# Patient Record
Sex: MALE | Race: WHITE | Employment: FULL TIME | URBAN - METROPOLITAN AREA
[De-identification: names, ages, dates, MRNs, and addresses within clinical notes are randomized per-mention and may not be internally consistent; named-entity substitution may affect disease eponyms.]

---

## 2017-04-17 ENCOUNTER — IP HISTORICAL/CONVERTED ENCOUNTER (OUTPATIENT)
Dept: OTHER | Age: 42
End: 2017-04-17

## 2017-07-21 ENCOUNTER — OP HISTORICAL/CONVERTED ENCOUNTER (OUTPATIENT)
Dept: OTHER | Age: 42
End: 2017-07-21

## 2018-04-24 ENCOUNTER — OFFICE VISIT (OUTPATIENT)
Dept: URGENT CARE | Age: 43
End: 2018-04-24

## 2018-04-24 VITALS
RESPIRATION RATE: 20 BRPM | TEMPERATURE: 98.2 F | SYSTOLIC BLOOD PRESSURE: 142 MMHG | HEIGHT: 74 IN | HEART RATE: 89 BPM | BODY MASS INDEX: 30.93 KG/M2 | DIASTOLIC BLOOD PRESSURE: 75 MMHG | OXYGEN SATURATION: 97 % | WEIGHT: 241 LBS

## 2018-04-24 DIAGNOSIS — L03.012 CELLULITIS OF FINGER OF LEFT HAND: Primary | ICD-10-CM

## 2018-04-24 DIAGNOSIS — R06.02 SOB (SHORTNESS OF BREATH): ICD-10-CM

## 2018-04-24 RX ORDER — LUBIPROSTONE 24 UG/1
24 CAPSULE, GELATIN COATED ORAL
COMMUNITY
Start: 2017-08-21

## 2018-04-24 RX ORDER — FUROSEMIDE 40 MG/1
TABLET ORAL
COMMUNITY
Start: 2017-08-15

## 2018-04-24 RX ORDER — LISINOPRIL 20 MG/1
20 TABLET ORAL
COMMUNITY
Start: 2017-09-18

## 2018-04-24 RX ORDER — DOXYCYCLINE 100 MG/1
100 TABLET ORAL 2 TIMES DAILY
Qty: 20 TAB | Refills: 0 | Status: SHIPPED | OUTPATIENT
Start: 2018-04-24 | End: 2018-05-04

## 2018-04-24 RX ORDER — CARVEDILOL 6.25 MG/1
6.25 TABLET ORAL
COMMUNITY
Start: 2017-09-24

## 2018-04-24 RX ORDER — PRASUGREL 10 MG/1
10 TABLET, FILM COATED ORAL
COMMUNITY

## 2018-04-24 RX ORDER — LEVETIRACETAM 750 MG/1
1 TABLET, EXTENDED RELEASE ORAL
COMMUNITY
Start: 2017-08-16

## 2018-04-24 RX ORDER — METFORMIN HYDROCHLORIDE 1000 MG/1
1000 TABLET ORAL
COMMUNITY
Start: 2017-09-18

## 2018-04-24 RX ORDER — GLIPIZIDE 10 MG/1
TABLET ORAL
COMMUNITY
Start: 2017-10-11

## 2018-04-24 RX ORDER — GUAIFENESIN 100 MG/5ML
LIQUID (ML) ORAL
COMMUNITY

## 2018-04-24 NOTE — PROGRESS NOTES
HPI Comments: Jennifer Majo with hx of  CAD s/p bypass, Sz, DM presents with left 4th finger infection after cutting finger 6 days ago, reports decreased ROM, worsening redness, swelling. Denies Fever. Reports SOB for the past few weeks. Went to cardiologist last week who stated \"bloodwork was fine\" per patient. Has not had CXR. Denies CP, dizziness, palpitations. The history is provided by the patient. Past Medical History:   Diagnosis Date    CAD (coronary artery disease)     Diabetes ([facility] [address] [facility]     Seizures ([facility]         History reviewed. No pertinent surgical history. History reviewed. No pertinent family history. Social History     Social History    Marital status: SINGLE     Spouse name: N/A    Number of children: N/A    Years of education: N/A     Occupational History    Not on file. Social History Main Topics    Smoking status: Current Every Day Smoker    Smokeless tobacco: Never Used    Alcohol use Not on file    Drug use: Not on file    Sexual activity: Not on file     Other Topics Concern    Not on file     Social History Narrative    No narrative on file                ALLERGIES: Amitriptyline; Cephalexin; Ciprofloxacin; Enoxaparin; Exenatide; Penicillins; and Repaglinide-metformin    Review of Systems   Constitutional: Negative for chills and fever. Respiratory: Positive for shortness of breath. Negative for wheezing. Cardiovascular: Negative for chest pain and palpitations. Musculoskeletal: Positive for arthralgias and joint swelling. Negative for myalgias. Skin: Positive for color change. Neurological: Negative for weakness and numbness. Hematological: Negative for adenopathy. Vitals:    04/24/18 1248   BP: 142/75   Pulse: 89   Resp: 20   Temp: 98.2 °F (36.8 °C)   SpO2: 97%   Weight: 241 lb (109.3 kg)   Height: 6' 2\" (1.88 m)       Physical Exam   Constitutional: He appears well-developed and well-nourished. No distress.    Cardiovascular: Normal rate, regular rhythm and normal heart sounds. Pulmonary/Chest: Effort normal and breath sounds normal. No respiratory distress. He has no wheezes. He has no rales. Musculoskeletal:   Left 4th finger at PIP joint: erythematous at the dorsal aspect, decreased flexion, slight swollen, TTP, warm   Neurological: He is alert. Skin: He is not diaphoretic. Psychiatric: He has a normal mood and affect. His behavior is normal. Judgment and thought content normal.   Nursing note and vitals reviewed. MDM    Procedures        ICD-10-CM ICD-9-CM    1. Cellulitis of finger of left hand L03.012 681.00 XR 4TH FINGER LT MIN 2 V   2. SOB (shortness of breath) R06.02 786.05 XR CHEST PA LAT     Medications Ordered Today   Medications    doxycycline (ADOXA) 100 mg tablet     Sig: Take 1 Tab by mouth two (2) times a day for 10 days. Dispense:  20 Tab     Refill:  0     The patients condition was discussed with the patient and they understand. The patient is to follow up with PCP. If signs and symptoms become worse the pt is to go to the ER. The patient is to take medications as prescribed. XR Results (most recent):    Results from Appointment encounter on 04/24/18   XR CHEST PA LAT   Narrative Exam:  2 view chest    Indication: Shortness of breath    PA and lateral views demonstrate normal heart size. The patient is status post  median sternotomy. Chronic pleural thickening is noted at the lung bases with  elevation of the left hemidiaphragm. Scarring is noted in the left lower lobe. There is no acute process in the lung fields. Mild degenerative changes are  noted in the thoracic spine. Impression Impression: No acute process.

## 2018-04-24 NOTE — PATIENT INSTRUCTIONS
Cellulitis: Care Instructions  Your Care Instructions    Cellulitis is a skin infection. It often occurs after a break in the skin from a scrape, cut, bite, or puncture, or after a rash. The doctor has checked you carefully, but problems can develop later. If you notice any problems or new symptoms, get medical treatment right away. Follow-up care is a key part of your treatment and safety. Be sure to make and go to all appointments, and call your doctor if you are having problems. It's also a good idea to know your test results and keep a list of the medicines you take. How can you care for yourself at home? · Take your antibiotics as directed. Do not stop taking them just because you feel better. You need to take the full course of antibiotics. · Prop up the infected area on pillows to reduce pain and swelling. Try to keep the area above the level of your heart as often as you can. · If your doctor told you how to care for your wound, follow your doctor's instructions. If you did not get instructions, follow this general advice:  ¨ Wash the wound with clean water 2 times a day. Don't use hydrogen peroxide or alcohol, which can slow healing. ¨ You may cover the wound with a thin layer of petroleum jelly, such as Vaseline, and a nonstick bandage. ¨ Apply more petroleum jelly and replace the bandage as needed. · Be safe with medicines. Take pain medicines exactly as directed. ¨ If the doctor gave you a prescription medicine for pain, take it as prescribed. ¨ If you are not taking a prescription pain medicine, ask your doctor if you can take an over-the-counter medicine. To prevent cellulitis in the future  · Try to prevent cuts, scrapes, or other injuries to your skin. Cellulitis most often occurs where there is a break in the skin. · If you get a scrape, cut, mild burn, or bite, wash the wound with clean water as soon as you can to help avoid infection.  Don't use hydrogen peroxide or alcohol, which can slow healing. · If you have swelling in your legs (edema), support stockings and good skin care may help prevent leg sores and cellulitis. · Take care of your feet, especially if you have diabetes or other conditions that increase the risk of infection. Wear shoes and socks. Do not go barefoot. If you have athlete's foot or other skin problems on your feet, talk to your doctor about how to treat them. When should you call for help? Call your doctor now or seek immediate medical care if:  ? · You have signs that your infection is getting worse, such as:  ¨ Increased pain, swelling, warmth, or redness. ¨ Red streaks leading from the area. ¨ Pus draining from the area. ¨ A fever. ? · You get a rash. ? Watch closely for changes in your health, and be sure to contact your doctor if:  ? · You are not getting better after 1 day (24 hours). ? · You do not get better as expected. Where can you learn more? Go to http://chet-praneeth.info/. Basil Quinones in the search box to learn more about \"Cellulitis: Care Instructions. \"  Current as of: October 13, 2016  Content Version: 11.4  © 8924-6019 [employer]. Care instructions adapted under license by [employer] (which disclaims liability or warranty for this information). If you have questions about a medical condition or this instruction, always ask your healthcare professional. [place] [address] any warranty or liability for your use of this information. Shortness of Breath: Care Instructions  Your Care Instructions  Shortness of breath has many causes. Sometimes conditions such as anxiety can lead to shortness of breath. Some people get mild shortness of breath when they exercise. Trouble breathing also can be a symptom of a serious problem, such as asthma, lung disease, emphysema, heart problems, and pneumonia. If your shortness of breath continues, you may need tests and treatment.  Watch for any changes in your breathing and other symptoms. Follow-up care is a key part of your treatment and safety. Be sure to make and go to all appointments, and call your doctor if you are having problems. It's also a good idea to know your test results and keep a list of the medicines you take. How can you care for yourself at home? · Do not smoke or allow others to smoke around you. If you need help quitting, talk to your doctor about stop-smoking programs and medicines. These can increase your chances of quitting for good. · Get plenty of rest and sleep. · Take your medicines exactly as prescribed. Call your doctor if you think you are having a problem with your medicine. · Find healthy ways to deal with stress. ¨ Exercise daily. ¨ Get plenty of sleep. ¨ Eat regularly and well. When should you call for help? Call 911 anytime you think you may need emergency care. For example, call if:  ? · You have severe shortness of breath. ? · You have symptoms of a heart attack. These may include:  ¨ Chest pain or pressure, or a strange feeling in the chest.  ¨ Sweating. ¨ Shortness of breath. ¨ Nausea or vomiting. ¨ Pain, pressure, or a strange feeling in the back, neck, jaw, or upper belly or in one or both shoulders or arms. ¨ Lightheadedness or sudden weakness. ¨ A fast or irregular heartbeat. After you call 911, the  may tell you to chew 1 adult-strength or 2 to 4 low-dose aspirin. Wait for an ambulance. Do not try to drive yourself. ?Call your doctor now or seek immediate medical care if:  ? · Your shortness of breath gets worse or you start to wheeze. Wheezing is a high-pitched sound when you breathe. ? · You wake up at night out of breath or have to prop your head up on several pillows to breathe. ? · You are short of breath after only light activity or while at rest.   ? Watch closely for changes in your health, and be sure to contact your doctor if:  ? · You do not get better over the next 1 to 2 days. Where can you learn more? Go to http://chet-praneeth.info/. Enter S780 in the search box to learn more about \"Shortness of Breath: Care Instructions. \"  Current as of: May 12, 2017  Content Version: 11.4  © 3511-3587 [employer]. Care instructions adapted under license by [employer] (which disclaims liability or warranty for this information). If you have questions about a medical condition or this instruction, always ask your healthcare professional. [address] any warranty or liability for your use of this information.

## 2018-04-24 NOTE — MR AVS SNAPSHOT
[facility]  
[phone] Patient: Juliette Harper MRN: [id number] :1975 Visit Information Date & Time Provider Department Dept. Phone Encounter #  
 2018 12:30 PM [facility] [phone] [id number] Upcoming Health Maintenance Date Due DTaP/Tdap/Td series (1 - Tdap) 10/23/1996 Influenza Age 5 to Adult 2017 Allergies as of 2018  Review Complete On: 2018 By: Cheyenne Galarza MD  
  
 Severity Noted Reaction Type Reactions Amitriptyline  10/13/2017    Unknown (comments)  
 seizures Cephalexin  10/13/2017    Unknown (comments)  
 seizures Ciprofloxacin  10/13/2017    Unknown (comments)  
 seizures Enoxaparin  10/13/2017    Unknown (comments)  
 seizures Exenatide Low 10/13/2017    Nausea and Vomiting, Rash Penicillins Low 10/13/2017    Rash Breathing difficulties Repaglinide-metformin Low 10/13/2017    Rash Current Immunizations  Never Reviewed No immunizations on file. Not reviewed this visit You Were Diagnosed With   
  
 Codes Comments Cellulitis of finger of left hand    -  Primary ICD-10-CM: L03.012 
ICD-9-CM: 681.00 SOB (shortness of breath)     ICD-10-CM: R06.02 
ICD-9-CM: 786.05 Vitals BP Pulse Temp Resp Height(growth percentile) Weight(growth percentile) 142/75 89 98.2 °F (36.8 °C) 20 6' 2\" (1.88 m) 241 lb (109.3 kg) SpO2 BMI Smoking Status 97% 30.94 kg/m2 Current Every Day Smoker BMI and BSA Data Body Mass Index Body Surface Area 30.94 kg/m 2 2.39 m 2 Your Updated Medication List  
  
   
This list is accurate as of 18  1:36 PM.  Always use your most recent med list.  
  
  
  
  
 AMITIZA 24 mcg capsule Generic drug:  lubiPROStone Take 24 mcg by mouth. aspirin 81 mg chewable tablet Take  by mouth.   
  
 carvedilol 6.25 mg tablet Commonly known as:  Joan Solid Take 6.25 mg by mouth. doxycycline 100 mg tablet Commonly known as:  ADOXA Take 1 Tab by mouth two (2) times a day for 10 days. furosemide 40 mg tablet Commonly known as:  LASIX TAKE 1 AND A HALF TABLETS (60 MG) BY MOUTH 2 TIMES PER DAY  
  
 glipiZIDE 10 mg tablet Commonly known as:  GLUCOTROL  
  
 levETIRAcetam 750 mg ER tablet Commonly known as:  KEPPRA XR Take 1 Tab by mouth.  
  
 lisinopril 20 mg tablet Commonly known as:  Pecola Cypher Take 20 mg by mouth.  
  
 metFORMIN 1,000 mg tablet Commonly known as:  GLUCOPHAGE Take 1,000 mg by mouth.  
  
 prasugrel 10 mg tablet Commonly known as:  EFFIENT Take 10 mg by mouth. Prescriptions Printed Refills  
 doxycycline (ADOXA) 100 mg tablet 0 Sig: Take 1 Tab by mouth two (2) times a day for 10 days. Class: Print Route: Oral  
  
To-Do List   
 04/24/2018 Imaging:  XR 4TH FINGER LT MIN 2 V   
  
 04/24/2018 Imaging:  XR CHEST PA LAT Patient Instructions Cellulitis: Care Instructions Your Care Instructions Cellulitis is a skin infection. It often occurs after a break in the skin from a scrape, cut, bite, or puncture, or after a rash. The doctor has checked you carefully, but problems can develop later. If you notice any problems or new symptoms, get medical treatment right away. Follow-up care is a key part of your treatment and safety. Be sure to make and go to all appointments, and call your doctor if you are having problems. It's also a good idea to know your test results and keep a list of the medicines you take. How can you care for yourself at home? · Take your antibiotics as directed. Do not stop taking them just because you feel better. You need to take the full course of antibiotics. · Prop up the infected area on pillows to reduce pain and swelling. Try to keep the area above the level of your heart as often as you can.  
· If your doctor told you how to care for your wound, follow your doctor's instructions. If you did not get instructions, follow this general advice: ¨ Wash the wound with clean water 2 times a day. Don't use hydrogen peroxide or alcohol, which can slow healing. ¨ You may cover the wound with a thin layer of petroleum jelly, such as Vaseline, and a nonstick bandage. ¨ Apply more petroleum jelly and replace the bandage as needed. · Be safe with medicines. Take pain medicines exactly as directed. ¨ If the doctor gave you a prescription medicine for pain, take it as prescribed. ¨ If you are not taking a prescription pain medicine, ask your doctor if you can take an over-the-counter medicine. To prevent cellulitis in the future · Try to prevent cuts, scrapes, or other injuries to your skin. Cellulitis most often occurs where there is a break in the skin. · If you get a scrape, cut, mild burn, or bite, wash the wound with clean water as soon as you can to help avoid infection. Don't use hydrogen peroxide or alcohol, which can slow healing. · If you have swelling in your legs (edema), support stockings and good skin care may help prevent leg sores and cellulitis. · Take care of your feet, especially if you have diabetes or other conditions that increase the risk of infection. Wear shoes and socks. Do not go barefoot. If you have athlete's foot or other skin problems on your feet, talk to your doctor about how to treat them. When should you call for help? Call your doctor now or seek immediate medical care if: 
? · You have signs that your infection is getting worse, such as: 
¨ Increased pain, swelling, warmth, or redness. ¨ Red streaks leading from the area. ¨ Pus draining from the area. ¨ A fever. ? · You get a rash. ? Watch closely for changes in your health, and be sure to contact your doctor if: 
? · You are not getting better after 1 day (24 hours). ? · You do not get better as expected.   
Where can you learn more? Go to http://chet-praneeth.info/. Juan Murrell in the search box to learn more about \"Cellulitis: Care Instructions. \" Current as of: October 13, 2016 Content Version: 11.4 © 8067-0199 [employer]. Care instructions adapted under license by [employer] (which disclaims liability or warranty for this information). If you have questions about a medical condition or this instruction, always ask your healthcare professional. [address] any warranty or liability for your use of this information. Shortness of Breath: Care Instructions Your Care Instructions Shortness of breath has many causes. Sometimes conditions such as anxiety can lead to shortness of breath. Some people get mild shortness of breath when they exercise. Trouble breathing also can be a symptom of a serious problem, such as asthma, lung disease, emphysema, heart problems, and pneumonia. If your shortness of breath continues, you may need tests and treatment. Watch for any changes in your breathing and other symptoms. Follow-up care is a key part of your treatment and safety. Be sure to make and go to all appointments, and call your doctor if you are having problems. It's also a good idea to know your test results and keep a list of the medicines you take. How can you care for yourself at home? · Do not smoke or allow others to smoke around you. If you need help quitting, talk to your doctor about stop-smoking programs and medicines. These can increase your chances of quitting for good. · Get plenty of rest and sleep. · Take your medicines exactly as prescribed. Call your doctor if you think you are having a problem with your medicine. · Find healthy ways to deal with stress. ¨ Exercise daily. ¨ Get plenty of sleep. ¨ Eat regularly and well. When should you call for help? Call 911 anytime you think you may need emergency care.  For example, call if: ? · You have severe shortness of breath. ? · You have symptoms of a heart attack. These may include: ¨ Chest pain or pressure, or a strange feeling in the chest. 
¨ Sweating. ¨ Shortness of breath. ¨ Nausea or vomiting. ¨ Pain, pressure, or a strange feeling in the back, neck, jaw, or upper belly or in one or both shoulders or arms. ¨ Lightheadedness or sudden weakness. ¨ A fast or irregular heartbeat. After you call 911, the  may tell you to chew 1 adult-strength or 2 to 4 low-dose aspirin. Wait for an ambulance. Do not try to drive yourself. ?Call your doctor now or seek immediate medical care if: 
? · Your shortness of breath gets worse or you start to wheeze. Wheezing is a high-pitched sound when you breathe. ? · You wake up at night out of breath or have to prop your head up on several pillows to breathe. ? · You are short of breath after only light activity or while at rest. ? Watch closely for changes in your health, and be sure to contact your doctor if: 
? · You do not get better over the next 1 to 2 days. Where can you learn more? Go to http://chet-praneeth.info/. Enter S780 in the search box to learn more about \"Shortness of Breath: Care Instructions. \" Current as of: May 12, 2017 Content Version: 11.4 © 2675-6222 [employer]ia. Care instructions adapted under license by [employer] (which disclaims liability or warranty for this information). If you have questions about a medical condition or this instruction, always ask your healthcare professional. [place] [address] any warranty or liability for your use of this information. Introducing [facility]! Pauline Manning introduces [employer] patient portal. Now you can access parts of your medical record, email your doctor's office, and request medication refills online. 1. In your internet browser, go to [email or web address] 2.  Click on the First Time User? Click Here link in the Sign In box. You will see the New Member Sign Up page. 3. Enter your Zhanzuo Access Code exactly as it appears below. You will not need to use this code after youve completed the sign-up process. If you do not sign up before the expiration date, you must request a new code. · Zhan[employer]o Access Code: [email or web address] Expires: 7/23/2018 12:33 PM 
 
4. Enter the last four digits of your Social Security Number (xxxx) and Date of Birth (mm/dd/yyyy) as indicated and click Submit. You will be taken to the next sign-up page. 5. Create a Zhanzuo ID. This will be your Zhanzuo login ID and cannot be changed, so think of one that is secure and easy to remember. 6. Create a Zhanzuo password. You can change your password at any time. 7. Enter your Password Reset Question and Answer. This can be used at a later time if you forget your password. 8. Enter your e-mail address. You will receive e-mail notification when new information is available in [address] 9. Click Sign Up. You can now view and download portions of your medical record. 10. Click the Download Summary menu link to download a portable copy of your medical information. If you have questions, please visit the Frequently Asked Questions section of the Zhan[employer]o website. Remember, Zhanzuo is NOT to be used for urgent needs. For medical emergencies, dial 911. Now available from your iPhone and Android! Please provide this summary of care documentation to your next provider. Your primary care clinician is listed as Caitie Later. If you have any questions after today's visit, please call [phone].

## 2021-07-20 ENCOUNTER — HOSPITAL ENCOUNTER (EMERGENCY)
Age: 46
Discharge: HOME | End: 2021-07-20
Attending: EMERGENCY MEDICINE
Payer: COMMERCIAL

## 2021-07-20 VITALS
HEART RATE: 65 BPM | OXYGEN SATURATION: 98 % | TEMPERATURE: 97.8 F | RESPIRATION RATE: 18 BRPM | DIASTOLIC BLOOD PRESSURE: 72 MMHG | HEIGHT: 73 IN | SYSTOLIC BLOOD PRESSURE: 118 MMHG | WEIGHT: 250 LBS | BODY MASS INDEX: 33.13 KG/M2

## 2021-07-20 DIAGNOSIS — N17.9 ACUTE RENAL FAILURE, UNSPECIFIED ACUTE RENAL FAILURE TYPE (HCC): ICD-10-CM

## 2021-07-20 DIAGNOSIS — E87.5 ACUTE HYPERKALEMIA: ICD-10-CM

## 2021-07-20 DIAGNOSIS — R89.9 ABNORMAL LABORATORY TEST RESULT: Primary | ICD-10-CM

## 2021-07-20 DIAGNOSIS — E87.20 METABOLIC ACIDOSIS: ICD-10-CM

## 2021-07-20 LAB
ANION GAP SERPL CALC-SCNC: 9 MMOL/L (ref 5–15)
BASOPHILS # BLD: 0.1 K/UL (ref 0–0.1)
BASOPHILS NFR BLD: 1 % (ref 0–1)
BUN SERPL-MCNC: 90 MG/DL (ref 6–20)
BUN/CREAT SERPL: 9 (ref 12–20)
CA-I BLD-MCNC: 9.4 MG/DL (ref 8.5–10.1)
CHLORIDE SERPL-SCNC: 108 MMOL/L (ref 97–108)
CO2 SERPL-SCNC: 19 MMOL/L (ref 21–32)
CREAT SERPL-MCNC: 9.75 MG/DL (ref 0.7–1.3)
DIFFERENTIAL METHOD BLD: ABNORMAL
EOSINOPHIL # BLD: 0.2 K/UL (ref 0–0.4)
EOSINOPHIL NFR BLD: 2 % (ref 0–7)
ERYTHROCYTE [DISTWIDTH] IN BLOOD BY AUTOMATED COUNT: 11.9 % (ref 11.5–14.5)
GLUCOSE SERPL-MCNC: 100 MG/DL (ref 65–100)
HCT VFR BLD AUTO: 33 % (ref 36.6–50.3)
HGB BLD-MCNC: 11.4 G/DL (ref 12.1–17)
IMM GRANULOCYTES # BLD AUTO: 0 K/UL (ref 0–0.04)
IMM GRANULOCYTES NFR BLD AUTO: 0 % (ref 0–0.5)
LYMPHOCYTES # BLD: 2.1 K/UL (ref 0.8–3.5)
LYMPHOCYTES NFR BLD: 34 % (ref 12–49)
MAGNESIUM SERPL-MCNC: 3.1 MG/DL (ref 1.6–2.4)
MCH RBC QN AUTO: 32.9 PG (ref 26–34)
MCHC RBC AUTO-ENTMCNC: 34.5 G/DL (ref 30–36.5)
MCV RBC AUTO: 95.4 FL (ref 80–99)
MONOCYTES # BLD: 0.5 K/UL (ref 0–1)
MONOCYTES NFR BLD: 9 % (ref 5–13)
NEUTS SEG # BLD: 3.4 K/UL (ref 1.8–8)
NEUTS SEG NFR BLD: 54 % (ref 32–75)
NRBC # BLD: 0 K/UL (ref 0–0.01)
NRBC BLD-RTO: 0 PER 100 WBC
PHOSPHATE SERPL-MCNC: 6.2 MG/DL (ref 2.6–4.7)
PLATELET # BLD AUTO: 180 K/UL (ref 150–400)
PMV BLD AUTO: 11.2 FL (ref 8.9–12.9)
POTASSIUM SERPL-SCNC: 5.8 MMOL/L (ref 3.5–5.1)
POTASSIUM SERPL-SCNC: 5.8 MMOL/L (ref 3.5–5.1)
RBC # BLD AUTO: 3.46 M/UL (ref 4.1–5.7)
SODIUM SERPL-SCNC: 136 MMOL/L (ref 136–145)
WBC # BLD AUTO: 6.1 K/UL (ref 4.1–11.1)

## 2021-07-20 PROCEDURE — 74011000250 HC RX REV CODE- 250: Performed by: EMERGENCY MEDICINE

## 2021-07-20 PROCEDURE — 96361 HYDRATE IV INFUSION ADD-ON: CPT

## 2021-07-20 PROCEDURE — 84132 ASSAY OF SERUM POTASSIUM: CPT

## 2021-07-20 PROCEDURE — 94640 AIRWAY INHALATION TREATMENT: CPT

## 2021-07-20 PROCEDURE — 74011250637 HC RX REV CODE- 250/637: Performed by: EMERGENCY MEDICINE

## 2021-07-20 PROCEDURE — 36415 COLL VENOUS BLD VENIPUNCTURE: CPT

## 2021-07-20 PROCEDURE — 96360 HYDRATION IV INFUSION INIT: CPT

## 2021-07-20 PROCEDURE — 74011250636 HC RX REV CODE- 250/636: Performed by: EMERGENCY MEDICINE

## 2021-07-20 PROCEDURE — 84100 ASSAY OF PHOSPHORUS: CPT

## 2021-07-20 PROCEDURE — 83735 ASSAY OF MAGNESIUM: CPT

## 2021-07-20 PROCEDURE — 99285 EMERGENCY DEPT VISIT HI MDM: CPT

## 2021-07-20 PROCEDURE — 85025 COMPLETE CBC W/AUTO DIFF WBC: CPT

## 2021-07-20 PROCEDURE — 99284 EMERGENCY DEPT VISIT MOD MDM: CPT

## 2021-07-20 PROCEDURE — 93005 ELECTROCARDIOGRAM TRACING: CPT

## 2021-07-20 RX ORDER — IPRATROPIUM BROMIDE AND ALBUTEROL SULFATE 2.5; .5 MG/3ML; MG/3ML
3 SOLUTION RESPIRATORY (INHALATION)
Status: COMPLETED | OUTPATIENT
Start: 2021-07-20 | End: 2021-07-20

## 2021-07-20 RX ORDER — SODIUM POLYSTYRENE SULFONATE 15 G/60ML
15 SUSPENSION ORAL; RECTAL
Status: COMPLETED | OUTPATIENT
Start: 2021-07-20 | End: 2021-07-20

## 2021-07-20 RX ADMIN — IPRATROPIUM BROMIDE AND ALBUTEROL SULFATE 3 ML: .5; 2.5 SOLUTION RESPIRATORY (INHALATION) at 12:47

## 2021-07-20 RX ADMIN — SODIUM CHLORIDE 1000 ML: 9 INJECTION, SOLUTION INTRAVENOUS at 13:29

## 2021-07-20 RX ADMIN — SODIUM POLYSTYRENE SULFONATE 15 G: 15 SUSPENSION ORAL; RECTAL at 14:29

## 2021-07-20 NOTE — Clinical Note
[facility] EMERGENCY DEPT  [place] [id number]  [phone]    Work/School Note    Date: 7/20/2021    To Whom It May concern: Kofi Perrin was seen and treated today in the emergency room by the following provider(s):  Attending Provider: Jose Paulino MD.      Kofi Perrin is excused from work/school on 7/20/2021 through 7/23/2021. He is medically clear to return to work/school on 7/24/2021.         Sincerely,          Bruno Villatoro MD

## 2021-07-20 NOTE — DISCHARGE INSTRUCTIONS
You are currently in acute kidney failure kidney failure, likely the result of severe dehydration to control your heart disease and the heat. This is a dangerous condition and can lead to heart problems and problems with your heart rhythm. PLAN TO GO AND SEE DR. Alba Galvan (KIDNEY PHYSICIAN) ON Thursday afternoon,  BETWEEN 2-430PM at the   [facility] location listed above. CALL DR ROBERT's OFFICE in the [phone] to get the specific time. TELL THEN DR Jayson Olson SPOKE WITH DR ROBERT ON Tuesday EVENING ABOUT THIS. Drink 1 liter of water tonight and then  and extra 1/2 liter on  to next tide you over until you can see Dr Alba Galvan. Given a medication called Kayexalate which should assist with decreasing the potassium through your stool. Please stop taking your multivitamins and drinking ANY sports drinks until you  you are seen by the kidney doctor. Suggestions for alternate medications as you wish to stop taking the Linzess would be to take LACTULOSE 15 to 30mL every 12 hours. Please discuss this with your primary care physician as this may be another way to treat your persistent constipation. .  Return to the ED as needed ESPECIALLY if you develop ravindra nausea/vomiting/lightheadedness or if you become concerned. Thank you! Thank you for allowing me to care for you in the emergency department. I sincerely hope that you are satisfied with your visit today. It is my goal to provide you with excellent care. Below you will find a list of your labs and imaging from your visit today. Should you have any questions regarding these results please do not hesitate to call the emergency department.     Labs -     Recent Results (from the past 12 hour(s))   CBC WITH AUTOMATED DIFF    Collection Time: 21 12:35 PM   Result Value Ref Range    WBC 6.1 4.1 - 11.1 K/uL    RBC 3.46 (L) 4.10 - 5.70 M/uL    HGB 11.4 (L) 12.1 - 17.0 g/dL    HCT 33.0 (L) 36.6 - 50.3 %    MCV 95.4 80.0 - 99.0 FL MCH 32.9 26.0 - 34.0 PG    MCHC 34.5 30.0 - 36.5 g/dL    RDW 11.9 11.5 - 14.5 %    PLATELET 025 241 - 121 K/uL    MPV 11.2 8.9 - 12.9 FL    NRBC 0.0 0.0  WBC    ABSOLUTE NRBC 0.00 0.00 - 0.01 K/uL    NEUTROPHILS 54 32 - 75 %    LYMPHOCYTES 34 12 - 49 %    MONOCYTES 9 5 - 13 %    EOSINOPHILS 2 0 - 7 %    BASOPHILS 1 0 - 1 %    IMMATURE GRANULOCYTES 0 0 - 0.5 %    ABS. NEUTROPHILS 3.4 1.8 - 8.0 K/UL    ABS. LYMPHOCYTES 2.1 0.8 - 3.5 K/UL    ABS. MONOCYTES 0.5 0.0 - 1.0 K/UL    ABS. EOSINOPHILS 0.2 0.0 - 0.4 K/UL    ABS. BASOPHILS 0.1 0.0 - 0.1 K/UL    ABS. IMM. GRANS. 0.0 0.00 - 0.04 K/UL    DF AUTOMATED     METABOLIC PANEL, BASIC    Collection Time: 07/20/21 12:35 PM   Result Value Ref Range    Sodium 136 136 - 145 mmol/L    Potassium 5.8 (H) 3.5 - 5.1 mmol/L    Chloride 108 97 - 108 mmol/L    CO2 19 (L) 21 - 32 mmol/L    Anion gap 9 5 - 15 mmol/L    Glucose 100 65 - 100 mg/dL    BUN 90 (H) 6 - 20 mg/dL    Creatinine 9.75 (H) 0.70 - 1.30 mg/dL    BUN/Creatinine ratio 9 (L) 12 - 20      GFR est AA 7 (L) >60 ml/min/1.73m2    GFR est non-AA 6 (L) >60 ml/min/1.73m2    Calcium 9.4 8.5 - 10.1 mg/dL   MAGNESIUM    Collection Time: 07/20/21 12:35 PM   Result Value Ref Range    Magnesium 3.1 (H) 1.6 - 2.4 mg/dL   PHOSPHORUS    Collection Time: 07/20/21 12:35 PM   Result Value Ref Range    Phosphorus 6.2 (H) 2.6 - 4.7 mg/dL   POTASSIUM    Collection Time: 07/20/21  5:22 PM   Result Value Ref Range    Potassium 5.8 (H) 3.5 - 5.1 mmol/L       Radiologic Studies -   No orders to display     CT Results  (Last 48 hours)      None          CXR Results  (Last 48 hours)      None               If you feel that you have not received excellent quality care or timely care, please ask to speak to the nurse manager. Please choose us in the future for your continued health care needs.    ------------------------------------------------------------------------------------------------------------  The exam and treatment you received in the Emergency Department were for an urgent problem and are not intended as complete care. It is important that you follow-up with a doctor, nurse practitioner, or physician assistant to:  (1) confirm your diagnosis,  (2) re-evaluation of changes in your illness and treatment, and  (3) for ongoing care. If your symptoms become worse or you do not improve as expected and you are unable to reach your usual health care provider, you should return to the Emergency Department. We are available 24 hours a day. Please take your discharge instructions with you when you go to your follow-up appointment. If you have any problem arranging a follow-up appointment, contact the Emergency Department immediately. If a prescription has been provided, please have it filled as soon as possible to prevent a delay in treatment. Read the entire medication instruction sheet provided to you by the pharmacy. If you have any questions or reservations about taking the medication due to side effects or interactions with other medications, please call your primary care physician or contact the ER to speak with the charge nurse. Make an appointment with your family doctor or the physician you were referred to for follow-up of this visit as instructed on your discharge paperwork, as this is a mandatory follow-up. Return to the ER if you are unable to be seen or if you are unable to be seen in a timely manner. If you have any problem arranging the follow-up visit, contact the Emergency Department immediately.

## 2021-07-21 NOTE — ED NOTES
Pt alert and oriented x4. gcs 15. Discharge instruction given and reviewed with pt by Vitaliy Lim. Pt verbalized understanding. Pt ambulated out of ED with stead gait. No signs of acute distress noted.

## 2021-07-21 NOTE — ED NOTES
Writer spoke with pt and gave pt updated discharge information provided by provider. Pt stated he wrote new instructions down and was not able to come back to hospital to  discharge instructions. Pt verbalized understanding.

## 2021-07-22 LAB
ATRIAL RATE: 65 BPM
CALCULATED P AXIS, ECG09: 56 DEGREES
CALCULATED R AXIS, ECG10: 86 DEGREES
CALCULATED T AXIS, ECG11: 74 DEGREES
DIAGNOSIS, 93000: NORMAL
P-R INTERVAL, ECG05: 194 MS
Q-T INTERVAL, ECG07: 384 MS
QRS DURATION, ECG06: 120 MS
QTC CALCULATION (BEZET), ECG08: 399 MS
VENTRICULAR RATE, ECG03: 65 BPM

## 2021-08-02 NOTE — ED PROVIDER NOTES
EMERGENCY DEPARTMENT HISTORY AND PHYSICAL EXAM        Date: 7/20/2021  Patient Name: Janine Collins    History of Presenting Illness     Chief Complaint   Patient presents with    Abnormal Lab Results       2:53 PM    History Provided By: Patient    HPI: Janine Collins, 39 y.o. male with remarkable past medical history significant for diabetes mellitus, coronary artery disease with stents, and previous MI presents to the ED for follow-up of abnormal laboratory value of elevated potassium found labs being drawn for upcoming clinic visit. Patient denies any significant change and his potassium intake however he does work in for the room at the [employer] where he says the temperatures become very very high and I (130 °F) and that he has been sweating and drinking lots of Gatorade prescriptions that contributed to his elevated potassium level. Review of systems per patient is otherwise negative stating that he feels in his usual state of health. Review of systems is negative for syncope/near syncope, chest pain, fever, chills, nausea, vomiting, polyuria polydipsia polyphagia hypoglycemia known Covid exposures. PCP: Soo Flowers MD    Current Outpatient Medications   Medication Sig Dispense Refill    lisinopril (PRINIVIL, ZESTRIL) 20 mg tablet Take 20 mg by mouth.  furosemide (LASIX) 40 mg tablet TAKE 1 AND A HALF TABLETS (60 MG) BY MOUTH 2 TIMES PER DAY      carvedilol (COREG) 6.25 mg tablet Take 6.25 mg by mouth.  metFORMIN (GLUCOPHAGE) 1,000 mg tablet Take 1,000 mg by mouth.  aspirin 81 mg chewable tablet Take  by mouth.  glipiZIDE (GLUCOTROL) 10 mg tablet       levETIRAcetam (KEPPRA XR) 750 mg ER tablet Take 1 Tab by mouth.  lubiPROStone (AMITIZA) 24 mcg capsule Take 24 mcg by mouth.  prasugrel (EFFIENT) 10 mg tablet Take 10 mg by mouth.          Past History     Past Medical History:  Past Medical History:   Diagnosis Date    CAD (coronary artery disease)  Diabetes ([facility] [facility]     Seizures ([facility] [facility]        Past Surgical History:  No past surgical history on file. Family History:  No family history on file. Social History:  Social History     Tobacco Use    Smoking status: Current Every Day Smoker    Smokeless tobacco: Never Used   Substance Use Topics    Alcohol use: Not on file    Drug use: Not on file       Allergies: Allergies   Allergen Reactions    Amitriptyline Unknown (comments)     seizures    Cephalexin Unknown (comments)     seizures    Ciprofloxacin Unknown (comments)     seizures    Enoxaparin Unknown (comments)     seizures    Exenatide Nausea and Vomiting and Rash    Penicillins Rash     Breathing difficulties    Repaglinide-Metformin Rash       Review of Systems   Review of Systems   Constitutional: Negative for chills, diaphoresis and fever. HENT: Negative for congestion, sore throat and trouble swallowing. Eyes: Negative for visual disturbance. Respiratory: Negative for cough and shortness of breath. Cardiovascular: Negative for chest pain and palpitations. Gastrointestinal: Negative for abdominal pain, diarrhea, nausea and vomiting. Endocrine: Negative for polydipsia, polyphagia and polyuria. Genitourinary: Negative for dysuria, frequency, hematuria and urgency. Musculoskeletal: Negative for gait problem and neck pain. Skin: Negative for rash. Neurological: Negative for dizziness, syncope and headaches. Physical Exam   Physical Exam  Constitutional:       General: He is not in acute distress. Appearance: He is well-developed. He is not ill-appearing. Comments: Well-developed well-nourished very tall white male; GCS of 15, patient seen excessively fatigued but is otherwise without complaints. HENT:      Head: Normocephalic and atraumatic. Nose: Nose normal.      Mouth/Throat:      Mouth: Mucous membranes are dry. Pharynx: No posterior oropharyngeal erythema.    Eyes:      General: Vision grossly intact. Extraocular Movements: Extraocular movements intact. Conjunctiva/sclera: Conjunctivae normal.      Pupils: Pupils are equal, round, and reactive to light. Neck:      Vascular: No JVD. Trachea: No tracheal deviation. Cardiovascular:      Rate and Rhythm: Normal rate and regular rhythm. Pulses: Normal pulses. Carotid pulses are 2+ on the right side and 2+ on the left side. Radial pulses are 2+ on the right side and 2+ on the left side. Femoral pulses are 2+ on the right side and 2+ on the left side. Popliteal pulses are 2+ on the right side and 2+ on the left side. Dorsalis pedis pulses are 2+ on the right side and 2+ on the left side. Posterior tibial pulses are 2+ on the right side and 2+ on the left side. Heart sounds: Normal heart sounds. Pulmonary:      Effort: Pulmonary effort is normal.      Breath sounds: Normal breath sounds and air entry. No wheezing or rhonchi. Abdominal:      General: Bowel sounds are normal.      Palpations: Abdomen is soft. Tenderness: There is no abdominal tenderness. There is no guarding or rebound. Musculoskeletal:         General: No swelling or deformity. Right shoulder: No swelling. Normal range of motion. Cervical back: Neck supple. Right lower leg: No edema. Left lower leg: No edema. Skin:     General: Skin is warm and dry. Capillary Refill: Capillary refill takes less than 2 seconds. Findings: No signs of injury or rash. Comments: +2+ tenting of skin   Neurological:      General: No focal deficit present. Mental Status: He is alert. Psychiatric:         Behavior: Behavior normal. Behavior is cooperative.          Diagnostic Study Results     Labs -   New Results - Labs    Updated   Order    07/20/21 1802  POTASSIUM   Collected: 07/20/21 1722  Final result  Specimen: Serum or Plasma     Potassium 5.8High  mmol/L            07/20/21 1311 METABOLIC PANEL, BASIC   Collected: 07/20/21 1235  Final result  Specimen: Serum or Plasma     Sodium 136 mmol/L BUN 90High  mg/dL   Potassium 5.8High  mmol/L Creatinine 9. 75High  mg/dL   Chloride 108 mmol/L BUN/Creatinine ratio 9Low      CO2 19Low  mmol/L GFR est AA 7Low  ml/min/1.73m2   Anion gap 9 mmol/L GFR est non-AA 6Low  ml/min/1.73m2    Glucose 100 mg/dL Calcium 9.4 mg/dL          07/20/21 1311  MAGNESIUM   Collected: 07/20/21 1235  Final result  Specimen: Serum or Plasma     Magnesium 3.1High  mg/dL            07/20/21 1311  PHOSPHORUS   Collected: 07/20/21 1235  Final result  Specimen: Serum or Plasma     Phosphorus 6. 2High  mg/dL            07/20/21 1301  CBC WITH AUTOMATED DIFF   Collected: 07/20/21 1235  Final result  Specimen: Whole Blood     WBC 6.1 K/uL LYMPHOCYTES 34 %   RBC 3.46Low  M/uL MONOCYTES 9 %   HGB 11.4Low  g/dL EOSINOPHILS 2 %   HCT 33. 0Low  % BASOPHILS 1 %   MCV 95.4 FL IMMATURE GRANULOCYTES 0 %   MCH 32.9 PG ABS. NEUTROPHILS 3.4 K/UL   MCHC 34.5 g/dL ABS. LYMPHOCYTES 2.1 K/UL   RDW 11.9 % ABS. MONOCYTES 0.5 K/UL   PLATELET 531 K/uL ABS. EOSINOPHILS 0.2 K/UL   MPV 11.2 FL ABS. BASOPHILS 0.1 K/UL   NRBC 0.0  WBC ABS. IMM. GRANS. 0.0 K/UL   ABSOLUTE NRBC 0.00 K/uL DF AUTOMATED     NEUTROPHILS 54 %            Medical Decision Making and ED Course     I have also reviewed the vital signs, available nursing notes, past medical history, past surgical history, family history and social history. Records Reviewed: Nursing Notes and Old medical records as available. Medical Decision Making/Diff Dx:  Differential diagnosis electrolyte abnormality, dehydration, laboratory error, excess potassium intake    55-year-old white male presents with elevated potassium on laboratory values on outpatient. Repeat screening labs in the ED and disposition per results.   Patient with marked evidence clinically of dehydration and gives history of extensive heat exposure during his current heat wave at his job. We will give him IV fluids as he has no history of congestive heart failure. ED course/Re-evaluation/Consultations/Other      Patient's laboratory findings remarkably surprising for evidence of acute renal failure which is new information for the patient and his family as well. Patient states that he is in the past had no problem with his kidneys despite his long history of hypertension. This could explain the patient's elevation of potassium to 5.65.8 on his outpatient laboratories. In the ED patient potassium is 5.8 but BUN and creatinine are 90/9.6. Given patient does dose of albuterol upon his arrival response to be elevated potassium level that was reported at that time and had administered a dose of Kayexalate. 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 Have discussed ramifications of signing out with this degree of kidney injury which patient acknowledges understanding and states he would only prefer to work up and evaluate outpatient. Patient is agreed to take a second liter of IV fluid in the ED while conversation intact with nephrology on-call. Case discussed with the nephrologist on-call who is equally as surprised that patient is not willing to stay nor that he is particularly symptomatic. HE agrees with my assessment that this is likely all prerenal and not primary intrinsic renal injury.   He agrees with plan for 2 L IV fluid bolus and states he will see the patient on Thursday in his office hours at the patient be given his office phone number and will be given to patient by staff for him to come in and see me nephrologist.    Seriousness and life-threatening nature of the situation as well as recommendations recommendations were discussed again with the patient and his mother,however patient remains adamant that he wants to be discharged home and that he would follow-up as directed outpatient. Patient was also directed to hold his diuretics next 2 days until he is seen by nephrology as this may worsen his renal function. Procedures     PROCEDURES:  Procedures  Melanie Calzada MD        Disposition     Discharged    DISCHARGE PLAN:  1. Discharge Medication List as of 7/20/2021  7:47 PM        2. Cannot display discharge medications since this patient is not currently admitted. 3.   Follow-up Information     Follow up With Specialties Details Why Contact Info    Amy Dumont MD Family Medicine Call in 1 day For follow-up and repeat of POTASSIUM results from today. [address] [facility] [address] [facility]      Lonni Canavan, MD Internal Medicine Call  [address] [facility] [address]      Sarah Dotson MD Cardiology, [address] [facility] Vascular Surgery Call  [address] [id number]  [phone]          4. Return to ED if worse     Diagnosis     Clinical impression:   1. Abnormal laboratory test result    2. Acute renal failure, unspecified acute renal failure type ([address]    3. Acute hyperkalemia    4.  Metabolic acidosis       :

## 2023-05-14 RX ORDER — GLIPIZIDE 10 MG/1
TABLET ORAL
COMMUNITY
Start: 2017-10-11

## 2023-05-14 RX ORDER — FUROSEMIDE 40 MG/1
TABLET ORAL
COMMUNITY
Start: 2017-08-15

## 2023-05-14 RX ORDER — ASPIRIN 81 MG/1
TABLET, CHEWABLE ORAL
COMMUNITY

## 2023-05-14 RX ORDER — LUBIPROSTONE 24 UG/1
24 CAPSULE ORAL
COMMUNITY
Start: 2017-08-21

## 2023-05-14 RX ORDER — CARVEDILOL 6.25 MG/1
6.25 TABLET ORAL
COMMUNITY
Start: 2017-09-24

## 2023-05-14 RX ORDER — LEVETIRACETAM 750 MG/1
1 TABLET, EXTENDED RELEASE ORAL
COMMUNITY
Start: 2017-08-16

## 2023-05-14 RX ORDER — PRASUGREL 10 MG/1
10 TABLET, FILM COATED ORAL
COMMUNITY

## 2023-05-14 RX ORDER — LISINOPRIL 20 MG/1
20 TABLET ORAL
COMMUNITY
Start: 2017-09-18